# Patient Record
Sex: MALE
[De-identification: names, ages, dates, MRNs, and addresses within clinical notes are randomized per-mention and may not be internally consistent; named-entity substitution may affect disease eponyms.]

---

## 2018-09-24 ENCOUNTER — HOSPITAL ENCOUNTER (EMERGENCY)
Dept: HOSPITAL 14 - H.ER | Age: 58
Discharge: HOME | End: 2018-09-24
Payer: COMMERCIAL

## 2018-09-24 VITALS — DIASTOLIC BLOOD PRESSURE: 85 MMHG | OXYGEN SATURATION: 95 % | HEART RATE: 75 BPM | SYSTOLIC BLOOD PRESSURE: 160 MMHG

## 2018-09-24 VITALS — TEMPERATURE: 97.8 F | RESPIRATION RATE: 16 BRPM
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
## 2018-09-24 DIAGNOSIS — S22.39XA: Primary | ICD-10-CM
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
## 2018-09-24 NOTE — RAD
Date of service: 



09/24/2018



PROCEDURE:  Radiographs of the Chest and Left Ribs.



HISTORY:

trauma



COMPARISON:

Correlations made to chest radiograph dated 12/22/2012. 



TECHNIQUE:

Frontal radiograph of the chest and multiple oblique radiographs of 

the left ribs were obtained.



FINDINGS:



LEFT RIBS:

No fracture or focal lesion visualized.



LUNGS:

Clear.



PLEURA:

Stable elevation of the right hemidiaphragm.  No pneumothorax or 

pleural fluid.



CARDIOVASCULAR:

Atherosclerotic aortic calcifications.  Cardiomediastinal silhouette 

stably enlarged.



OTHER FINDINGS:

None.



IMPRESSION:

Unremarkable radiographs of the chest and left ribs. No left rib 

fracture.

## 2018-09-24 NOTE — ED PDOC
HPI: Back


Time Seen by Provider: 09/24/18 10:08


Chief Complaint (Nursing): Back Pain


Chief Complaint (Provider): Left Rib Pain


History Per: Patient


History/Exam Limitations: no limitations


Onset/Duration Of Symptoms: Days (x3)


Current Symptoms Are (Timing): Still Present


Additional Complaint(s): 


57 year old male, with a past medical history of diabetes, presenting for 

evaluation of left rib pain x3 days. Patient states she fell and hit her ribs 

against a rail. Patient reports pain is worse on inspiration and coughing. 

Patient denies any shortness of breath. 








Past Medical History


Reviewed: Historical Data, Nursing Documentation, Vital Signs


Vital Signs: 


 Last Vital Signs











Temp  97.8 F   09/24/18 09:55


 


Pulse  89   09/24/18 09:55


 


Resp  16   09/24/18 09:55


 


BP  178/89 H  09/24/18 09:55


 


Pulse Ox  99   09/24/18 09:55














- Medical History


PMH: Diabetes





- Surgical History


Surgical History: No Surg Hx





- Family History


Family History: States: Unknown Family Hx





- Home Medications


Home Medications: 


 Ambulatory Orders











 Medication  Instructions  Recorded


 


traMADol [Ultram] 50 mg PO Q8 #10 tab 09/24/18














- Allergies


Allergies/Adverse Reactions: 


 Allergies











Allergy/AdvReac Type Severity Reaction Status Date / Time


 


No Known Allergies Allergy   Verified 09/24/18 09:55














Review of Systems


ROS Statement: Except As Marked, All Systems Reviewed And Found Negative


Respiratory: Negative for: Shortness of Breath


Musculoskeletal: Positive for: Other (left rib pain)





Physical Exam





- Reviewed


Nursing Documentation Reviewed: Yes


Vital Signs Reviewed: Yes





- Physical Exam


Appears: Positive for: Non-toxic, No Acute Distress


Head Exam: Positive for: ATRAUMATIC, NORMAL INSPECTION, NORMOCEPHALIC


Skin: Positive for: Normal Color, Warm, Dry.  Negative for: Rash


Eye Exam: Positive for: EOMI, Normal appearance, PERRL


Cardiovascular/Chest: Positive for: Regular Rate, Rhythm.  Negative for: Chest 

Non Tender ((+) tenderness to left posterior lateral and lower ribs, (-) 

ecchymosis, (-) flail)


Respiratory: Positive for: Normal Breath Sounds.  Negative for: Respiratory 

Distress


Gastrointestinal/Abdominal: Positive for: Normal Exam, Soft.  Negative for: 

Tenderness


Back: Positive for: Normal Inspection.  Negative for: L CVA Tenderness, R CVA 

Tenderness, Vertebral Tenderness


Extremity: Positive for: Normal ROM.  Negative for: Tenderness, Deformity


Neurologic/Psych: Positive for: Alert, Oriented.  Negative for: Motor/Sensory 

Deficits





- ECG


O2 Sat by Pulse Oximetry: 99 (RA)


Pulse Ox Interpretation: Normal





Medical Decision Making


Medical Decision Making: 


Plan:


-X-ray left ribs


-Tylenol-3 1 tab PO


-Ultram 50mg PO


-Reevaluation





________________________________________________________________________________


Scribe Attestation:   


Documented by Edgar Pittman, acting as a scribe for Tony Fisher MD.





Provider Scribe Attestation:


All medical record entries made by the Scribe were at my direction and 

personally dictated by me. I have reviewed the chart and agree that the record 

accurately reflects my personal performance of the history, physical exam, 

medical decision making, and the department course for this patient. I have 

also personally directed, reviewed, and agree with the discharge instructions 

and disposition. 








Disposition





- Clinical Impression


Clinical Impression: 


 Rib fracture








- Patient ED Disposition


Is Patient to be Admitted: No


Counseled Patient/Family Regarding: Studies Performed, Diagnosis, Need For 

Followup, Rx Given





- Disposition


Referrals: 


[facility] [Outside]


Disposition: Routine/Home


Disposition Time: 10:48


Condition: FAIR


Prescriptions: 


traMADol [Ultram] 50 mg PO Q8 #10 tab


Instructions:  Rib Fractures in Adults


Forms:  [facility] (English)